# Patient Record
(demographics unavailable — no encounter records)

---

## 2020-06-03 NOTE — DIAGNOSTIC IMAGING REPORT
Acute Abdominal Series



CPT CODE: 23888



INDICATION: Abdominal pain.



COMPARISON: None.



FINDINGS: 



Single view of the chest shows mild eventration of the right diaphragm and

discoid atelectasis of the left lower lobe. Cardiomediastinal silhouette is

normal.



Supine and erect views of the abdomen:



Medical Devices: Cholecystectomy clips in the right upper quadrant.



Bowel: Unremarkable bowel gas pattern. There is little small bowel air. The

stomach is distended with fluid and air. Small amount of stool and air in the

right colon.



Free air:  None



Abdominal calcifications: None over the renal shadows or along the expected

course of the ureters.



Organomegaly: None



Bones: There is sclerosis and cortical cuffing of the right ischial tuberosity

suggestive of stress reaction.



IMPRESSION:



1.  Fluid and air distention of the stomach with little small and large bowel

air. Findings are suggestive of ileus.

2.  Mild eventration of the right diaphragm of uncertain chronicity. Discoid

atelectasis of the left lower lobe.

3. Cholecystectomy.



Signed by: Dr. Cody Trujillo MD on 6/3/2020 9:20 PM

## 2020-06-03 NOTE — DIAGNOSTIC IMAGING REPORT
CT Abdomen And Pelvis with Intravenous Contrast



INDICATION:  

^ABD PAIN, FINDINGS OF KUB OF POSSIBLE ILEUS

^[id number]

^2220

^Y



TECHNIQUE: Thin collimation axial images obtained from the diaphragm to the

level of the pubic symphysis following the uneventful administration of  100 cc

of low osmolar, nonionic intravenous contrast. Oral contrast was also

administered.



Dose reduction techniques used: Automated exposure control, adjustment of the

mAs and/or kVp according to patient size, standardized low-dose protocol,

and/or iterative reconstruction technique.





RADIATION DOSE:

     Total DLP: 602.06 mGy*cm    

     Estimated effective dose: (DLP x 0.015 x size factor) mSv

     CTDIvol has been reviewed. It is below the limits set by the Radiation

Protocol Committee (RPC).



COMPARISON: None.

 

ABDOMEN FINDINGS:



Lung Bases: Small foci of microatelectasis in the posterior right costophrenic

angle. Mild eventration of the right diaphragm with overlying subsegmental

atelectasis. The distal esophagus is distended with enteric contrast. No hiatal

hernia.



Liver: Decreased attenuation.  No evidence for mass.



Gallbladder: Absent. No biliary ductal dilatation.



Pancreas: Diffuse mild atrophy. No mass or ductal dilatation.



Spleen: Normal in size.  No evidence of mass



Adrenal Glands: No evidence for mass.



Kidneys: 



Right: Normal enhancement.  No soft tissue mass.  No hydronephrosis.



Left:  Normal enhancement.  No soft tissue mass.  No hydronephrosis.



Lymph Nodes: No enlarged abdominal or periaortic lymph nodes.



Aorta:   Normal in diameter.



PELVIS FINDINGS: 



Bowel: 

Stomach:  Distended with enteric contrast, air, and small amount of food. No

mural thickening. No perigastric air.

Small Bowel: Enteric contrast present throughout. No mural thickening or

dilatation.  

Large Bowel: No enteric contrast. No mural thickening or pericolonic

inflammation.  

Appendix: Normal.



Bladder: Normal.



The prostate gland is mildly enlarged.



Peritoneum/retroperitoneum: No free fluid, fluid collection, or free air.



Bones: Flowing osteophytes of the lower thoracic spine suggestive of DISH.

Degenerative changes of the thoracic spine. No compression deformities.



Soft tissues: Unremarkable.



IMPRESSION:



1.   No evidence of small or large bowel obstruction. Dilatation of the stomach

without obstructing mass. Please correlate with history of gastroparesis.



2.   Steatosis.



3. Cholecystectomy. No bile duct dilatation.



Signed by: Dr. Cody Trujillo MD on 6/3/2020 10:38 PM

## 2020-06-03 NOTE — EMERGENCY DEPARTMENT NOTE
History of Present Illnes


History of Present Illness


Chief Complaint:  Abdominal Complaints


History of Present Illness


This is a 46 year old  male REPORTS ABDOMINAL PAIN X2 HRS; LBM AT 1900; 

PT DENIES ANY N/V OR


   DIARRHEA, DENIES ANY BURNING WITH URINATION  .


Historian:  Patient, Family Member


Onset (how long ago):  hour(s) (2)


Location:  EPIGASTRIC AREA


Quality:  EPIGASTRIC PAIN


Severity:  moderate


Onset quality:  sudden


Duration (how long):  hour(s) (2)


Timing of current episode:  constant


Progression:  waxing and waning


Chronicity:  new


Context:  recent illness


Relieving factors:  none


Exacerbating factors:  none


Associated symptoms:  diaphoresis


Treatments prior to arrival:  none





Past Medical/Family History


Physician Review


I have reviewed the patient's past medical and family history.  Any updates have

been documented here.





Past Medical History


Recent Fever:  No


Clinical Suspicion of Infectio:  No


New/Unexplained Change in Ment:  No


Past Medical History:  None


Past Surgical History:  Cholecysctectomy





Social History


Smoking Cessation:  Never Smoker


Alcohol Use:  Occasional (7 BEERS ON SATURDAY)


Any Illegal Drug Use:  No





Family History


Family history of heart diseas:  No





Other


Last Tetanus:  UTD





Review of Systems


Review of Systems


Constitutional:  no symptoms


EENTM:  no symptoms


Cardiovascular:  no symptoms


Respiratory:  no symptoms


Gastrointestinal:  as per HPI


Genitourinary:  no symptoms


Musculoskeletal:  no symptoms


Neurological:  no symptoms


Psychological:  no symptoms


Endocrine:  no symptoms


Hematological/Lymphatic:  no symptoms


Review of other systems


All other systems reviewed and negative.





Physical Exam


Related Data


Allergies:  


Uncoded Allergies:  


     PENICILLIN (Allergy, Unknown, 18)


Triage Vital Signs





Vital Signs








  Date Time  Temp Pulse Resp B/P (MAP) Pulse Ox O2 Delivery O2 Flow Rate FiO2


 


6/3/20 20:23 97.8 79 17 157/92 96   








Vital signs reviewed:  Yes





Physical Exam


CONSTITUTIONAL





Constitutional:  well-developed, well-nourished, distressed (MILD)


HENT


HENT:  normocephalic, atraumatic, oropharynx clear/moist, nose normal


HENT L/R:  left ext ear normal, right ext ear normal


EYES





Eyes:  PERRL, conjunctivae normal


NECK


Neck:  ROM normal


PULMONARY


Pulmonary:  effort normal, breath sounds normal


CARDIOVASCULAR





Cardiovascular:  regular rhythm, heart sounds normal, capillary refill normal, 

normal rate


GASTROINTESTINAL





Abdominal:  soft, bowel sounds normal, tender (MODERATE TO EPIGASTRIC AREA); 

guarding, rebound, left CVA tenderness, right CVA tenderness


GENITOURINARY





Genitourinary:  exam deferred


SKIN


Skin:  warm, dry


MUSCULOSKELETAL





Musculoskeletal:  ROM normal


NEUROLOGICAL





Neurological:  alert, oriented x 3, no gross motor or sensory deficits


PSYCHOLOGICAL


Psychological:  mood/affect normal, judgement normal





Results


Laboratory


Laboratory





Laboratory Tests








Test


 6/3/20


20:31


 


White Blood Count


 10.92 x10e3/uL


(4.8-10.8)


 


Red Blood Count


 5.20 x10e6/uL


(4.3-5.7)


 


Hemoglobin


 15.3 g/dL


(14.0-18.0)


 


Hematocrit


 47.2 %


(38.2-49.6)


 


Mean Corpuscular Volume


 90.8 fL


(81-99)


 


Mean Corpuscular Hemoglobin


 29.4 pg


(28-32)


 


Mean Corpuscular Hemoglobin


Concent 32.4 g/dL


(31-35)


 


Red Cell Distribution Width


 13.6 %


(11.7-14.4)


 


Platelet Count


 378 x10e3/uL


(140-360)


 


Neutrophils (%) (Auto)


 64.7 %


(38.7-80.0)


 


Lymphocytes (%) (Auto)


 24.1 %


(18.0-39.1)


 


Monocytes (%) (Auto)


 8.4  %


(4.4-11.3)


 


Eosinophils (%) (Auto)


 1.8 %


(0.0-6.0)


 


Basophils (%) (Auto)


 0.5 %


(0.0-1.0)


 


Neutrophils # (Auto) 7.1 (2.1-6.9) 


 


Lymphocytes # (Auto) 2.6 (1.0-3.2) 


 


Monocytes # (Auto) 0.9 (0.2-0.8) 


 


Eosinophils # (Auto) 0.2 (0.0-0.4) 


 


Basophils # (Auto) 0.1 (0.0-0.1) 


 


Absolute Immature Granulocyte


(auto 0.05 x10e3/uL


(0-0.1)


 


Urine Color


 Yellow


(YELLOW)


 


Urine Clarity Clear (CLEAR) 


 


Urine pH 5.5 (5 - 7) 


 


Urine Specific Gravity


 1.030


(1.010-1.025)


 


Urine Protein


 Negative


(NEGATIVE)


 


Urine Glucose (UA)


 Negative


(NEGATIVE)


 


Urine Ketones


 Negative


(NEGATIVE)


 


Urine Blood


 Trace


(NEGATIVE)


 


Urine Nitrite


 Negative


(NEGATIVE)


 


Urine Bilirubin


 Negative


(NEGATIVE)


 


Urine Urobilinogen


 0.2 mg/dL (0.2


- 1)


 


Urine Leukocyte Esterase


 Negative


(NEGATIVE)


 


Urine RBC 0-5 /HPF (0-5) 


 


Urine WBC


 None /HPF


(0-5)


 


Urine Epithelial Cells


 Few /LPF


(NONE)


 


Urine Bacteria


 None /HPF


(NONE)


 


Sodium Level


 142 mmol/L


(136-145)


 


Potassium Level


 4.0 mmol/L


(3.5-5.1)


 


Chloride Level


 100 mmol/L


()


 


Carbon Dioxide Level


 29 mmol/L


(22-29)


 


Anion Gap


 17.0 mmol/L


(8-16)


 


Blood Urea Nitrogen


 17 mg/dL


(7-26)


 


Creatinine


 1.17 mg/dL


(0.72-1.25)


 


Estimat Glomerular Filtration


Rate > 60 ML/MIN


(60-)


 


BUN/Creatinine Ratio 15 (6-25) 


 


Glucose Level


 129 mg/dL


()


 


Calcium Level


 10.4 mg/dL


(8.4-10.2)


 


Total Bilirubin


 0.4 mg/dL


(0.2-1.2)


 


Aspartate Amino Transf


(AST/SGOT) 29 IU/L (5-34) 





 


Alanine Aminotransferase


(ALT/SGPT) 72 IU/L (0-55) 





 


Alkaline Phosphatase


 97 IU/L


()


 


Creatine Kinase


 62 IU/L


()


 


Creatine Kinase MB


 0.70 ng/mL


(0-5.0)


 


Troponin I


 0.020 ng/mL


(0-0.300)


 


Total Protein


 8.4 g/dL


(6.5-8.1)


 


Albumin


 4.7 g/dL


(3.5-5.0)


 


Globulin


 3.7 g/dL


(2.3-3.5)


 


Albumin/Globulin Ratio 1.3 (0.8-2.0) 


 


Amylase Level


 92 U/L


()


 


Lipase 20 U/L (8-78) 








Lab results reviewed:  Yes





Imaging


Imaging results reviewed:  Yes


Impressions





Patient Name: JOSUÉ LEE JR                          MR #: [id number]      


       


: 1973                         Age/Sex: 46/M


Acct #: [id number]                    Req #: [id number]


Adm Physician:                                      


Ordered by: TERRI AHMADI MD                    Report #: [id number] 


Location: ER                            Room/Bed:           


                                                                              __


________________________________________________________________________________


_________________





Procedure: [id number] DX/ABDOMEN ACUTE SERIES W/PA CXR


Exam Date: 20                            Exam Time: 








                              REPORT STATUS: Signed





Acute Abdominal Series





CPT CODE: 28502





INDICATION: Abdominal pain.





COMPARISON: None.





FINDINGS: 





Single view of the chest shows mild eventration of the right diaphragm and


discoid atelectasis of the left lower lobe. Cardiomediastinal silhouette is


normal.





Supine and erect views of the abdomen:





Medical Devices: Cholecystectomy clips in the right upper quadrant.





Bowel: Unremarkable bowel gas pattern. There is little small bowel air. The


stomach is distended with fluid and air. Small amount of stool and air in the


right colon.





Free air:  None





Abdominal calcifications: None over the renal shadows or along the expected


course of the ureters.





Organomegaly: None





Bones: There is sclerosis and cortical cuffing of the right ischial tuberosity


suggestive of stress reaction.





IMPRESSION:





1.  Fluid and air distention of the stomach with little small and large bowel


air. Findings are suggestive of ileus.


2.  Mild eventration of the right diaphragm of uncertain chronicity. Discoid


atelectasis of the left lower lobe.


3. Cholecystectomy.





Signed by: Dr. Sherley Trujillo MD on 6/3/2020 9:20 PM








Dictated By: SHERLEY TRUJILLO MD


Electronically Signed By: SHERLEY TRUJILLO MD on 20


Transcribed By: MINH on 20 








COPY TO:   TERRI AHMADI MD~








Procedure: [id number] CT/CT ABDOMEN/PELVIS W


Exam Date: 20                            Exam Time: 








                              REPORT STATUS: Signed





CT Abdomen And Pelvis with Intravenous Contrast





INDICATION:  


^ABD PAIN, FINDINGS OF KUB OF POSSIBLE ILEUS


^2020


^


^Y





TECHNIQUE: Thin collimation axial images obtained from the diaphragm to the


level of the pubic symphysis following the uneventful administration of  100 cc


of low osmolar, nonionic intravenous contrast. Oral contrast was also


administered.





Dose reduction techniques used: Automated exposure control, adjustment of the


mAs and/or kVp according to patient size, standardized low-dose protocol,


and/or iterative reconstruction technique.








RADIATION DOSE:


     Total DLP: 602.06 mGy*cm    


     Estimated effective dose: (DLP x 0.015 x size factor) mSv


     CTDIvol has been reviewed. It is below the limits set by the Radiation


Protocol Committee (RPC).





COMPARISON: None.


 


ABDOMEN FINDINGS:





Lung Bases: Small foci of microatelectasis in the posterior right costophrenic


angle. Mild eventration of the right diaphragm with overlying subsegmental


atelectasis. The distal esophagus is distended with enteric contrast. No hiatal


hernia.





Liver: Decreased attenuation.  No evidence for mass.





Gallbladder: Absent. No biliary ductal dilatation.





Pancreas: Diffuse mild atrophy. No mass or ductal dilatation.





Spleen: Normal in size.  No evidence of mass





Adrenal Glands: No evidence for mass.





Kidneys: 





Right: Normal enhancement.  No soft tissue mass.  No hydronephrosis.





Left:  Normal enhancement.  No soft tissue mass.  No hydronephrosis.





Lymph Nodes: No enlarged abdominal or periaortic lymph nodes.





Aorta:   Normal in diameter.





PELVIS FINDINGS: 





Bowel: 


Stomach:  Distended with enteric contrast, air, and small amount of food. No


mural thickening. No perigastric air.


Small Bowel: Enteric contrast present throughout. No mural thickening or


dilatation.  


Large Bowel: No enteric contrast. No mural thickening or pericolonic


inflammation.  


Appendix: Normal.





Bladder: Normal.





The prostate gland is mildly enlarged.





Peritoneum/retroperitoneum: No free fluid, fluid collection, or free air.





Bones: Flowing osteophytes of the lower thoracic spine suggestive of DISH.


Degenerative changes of the thoracic spine. No compression deformities.





Soft tissues: Unremarkable.





IMPRESSION:





1.   No evidence of small or large bowel obstruction. Dilatation of the stomach


without obstructing mass. Please correlate with history of gastroparesis.





2.   Steatosis.





3. Cholecystectomy. No bile duct dilatation.





Signed by: Dr. Sherley Trujillo MD on 6/3/2020 10:38 PM








Dictated By: SHERLEY TRUJILLO MD


Electronically Signed By: SHERLEY TRUJILLO MD on [id number]


Transcribed By: MINH on 20 








COPY TO:   TERRI AHMADI MD~





Procedures


12 Lead ECG Interpretation


:  Interpreted by ED physician


Date:  Lance 3, 2020


Time:  20:32


Rhythm:  sinus rhythm


Rate:  normal


BPM:  79


QRS axis:  normal


ST segments normal:  Yes


T waves normal:  Yes


Other findings:  no other findings


Clinical Impression:  normal ECG





Critical Care Time


Subsequent provider


I assumed direction of critical care for this patient from another provider of 

my specialty.





Assessment & Plan


Reassessment


Reassessment time:  22:51


Reassessment


PT RELIEVED AT THIS TIME





Assessment & Plan


Final Impression:  


(1) GASTRITIS, UNSPECIFIED, WITHOUT BLEEDING


Assessment & Plan


PT WITH UPPER ABD PAIN FOR 2 HOURS, H/O CHOLECYSTECTOMY IN THE PAST





CBC, CMP, AMYLASE, LIPASE, UA, ACUTE ABD SERIES, EKG, CARDIAC ENZYMES , UA O

RDERED TO EVAL FOR MYOCARDIAL INFARCTION, UTI, PANCREATITIS, BOWEL OBSTRUCTION, 

ELECTROLYTE ABNORMALITY





PROTONIX 40 MG IV ORDERED


BENTYL 20 MG IM ORDERED





PT DISCHARGED TO FOLLOW UP WITH GI. REFERRED TO DR FRANCA AGUAYO


PRESCRIPTIONS


BENTYL 20 MG PO Q 6 HOURS PRN ABD PAIN


PROTONIX 40 MG IV DAILY


BLAND DIET


Depart Disposition:  HOME, SELF-CARE


Last Vital Signs











  Date Time  Temp Pulse Resp B/P (MAP) Pulse Ox O2 Delivery O2 Flow Rate FiO2


 


6/3/20 20:23 97.8 79 17 157/92 96   








Home Meds


Reported Medications


Dexlansoprazole (DEXILANT) 60 Mg Cap.


   THERAPEUTIC INTERCHANGE WITH PROTONIX PER [facility]


   18


[Donnatal]   No Conflict Check


   18


Metronidazole (METRONIDAZOLE) 500 Mg Tablet, 500 MG PO, TAB


   18


Prednisone (PREDNISONE) 20 Mg Tab, 20 MG PO, TAB


   18


Medications in the ED





Pantoprazole Sodium 40 mg NOW  STAT IV ;  Start 6/3/20 at 20:23;  Stop 6/3/20 at

 20:25;  Status DC


Dicyclomine HCl 20 mg ONCE  ONCE IM ;  Start 6/3/20 at 20:30;  Stop 6/3/20 at 

20:31;  Status DC











TERRI AHMADI MD         Lance 3, 2020 20:32